# Patient Record
Sex: FEMALE | Race: WHITE | Employment: OTHER | URBAN - METROPOLITAN AREA
[De-identification: names, ages, dates, MRNs, and addresses within clinical notes are randomized per-mention and may not be internally consistent; named-entity substitution may affect disease eponyms.]

---

## 2017-04-05 ENCOUNTER — RECORDS - HEALTHEAST (OUTPATIENT)
Dept: LAB | Facility: CLINIC | Age: 67
End: 2017-04-05

## 2017-04-06 LAB
ALT SERPL W P-5'-P-CCNC: 17 U/L (ref 0–45)
CHOLEST SERPL-MCNC: 200 MG/DL
FASTING STATUS PATIENT QL REPORTED: ABNORMAL
HDLC SERPL-MCNC: 40 MG/DL
LDLC SERPL CALC-MCNC: 139 MG/DL
TRIGL SERPL-MCNC: 106 MG/DL

## 2018-01-09 ENCOUNTER — RECORDS - HEALTHEAST (OUTPATIENT)
Dept: LAB | Facility: CLINIC | Age: 68
End: 2018-01-09

## 2018-01-09 LAB
ALBUMIN UR-MCNC: NEGATIVE MG/DL
APPEARANCE UR: CLEAR
BACTERIA #/AREA URNS HPF: ABNORMAL HPF
BILIRUB UR QL STRIP: NEGATIVE
COLOR UR AUTO: ABNORMAL
GLUCOSE UR STRIP-MCNC: NEGATIVE MG/DL
HGB UR QL STRIP: ABNORMAL
KETONES UR STRIP-MCNC: NEGATIVE MG/DL
LEUKOCYTE ESTERASE UR QL STRIP: NEGATIVE
NITRATE UR QL: NEGATIVE
PH UR STRIP: 5 [PH] (ref 4.5–8)
RBC #/AREA URNS AUTO: ABNORMAL HPF
SP GR UR STRIP: 1 (ref 1–1.03)
SQUAMOUS #/AREA URNS AUTO: ABNORMAL LPF
TRANS CELLS #/AREA URNS HPF: ABNORMAL LPF
UROBILINOGEN UR STRIP-ACNC: ABNORMAL
WBC #/AREA URNS AUTO: ABNORMAL HPF

## 2018-07-05 ENCOUNTER — RECORDS - HEALTHEAST (OUTPATIENT)
Dept: LAB | Facility: CLINIC | Age: 68
End: 2018-07-05

## 2018-07-05 LAB
ANION GAP SERPL CALCULATED.3IONS-SCNC: 12 MMOL/L (ref 5–18)
BASOPHILS # BLD AUTO: 0 THOU/UL (ref 0–0.2)
BASOPHILS NFR BLD AUTO: 0 % (ref 0–2)
BUN SERPL-MCNC: 22 MG/DL (ref 8–28)
CALCIUM SERPL-MCNC: 9.3 MG/DL (ref 8.5–10.5)
CHLORIDE BLD-SCNC: 108 MMOL/L (ref 98–107)
CO2 SERPL-SCNC: 22 MMOL/L (ref 22–31)
CREAT SERPL-MCNC: 1.06 MG/DL (ref 0.6–1.1)
EOSINOPHIL # BLD AUTO: 0.1 THOU/UL (ref 0–0.4)
EOSINOPHIL NFR BLD AUTO: 2 % (ref 0–6)
ERYTHROCYTE [DISTWIDTH] IN BLOOD BY AUTOMATED COUNT: 13.6 % (ref 11–14.5)
GFR SERPL CREATININE-BSD FRML MDRD: 51 ML/MIN/1.73M2
GLUCOSE BLD-MCNC: 125 MG/DL (ref 70–125)
HCT VFR BLD AUTO: 35.1 % (ref 35–47)
HGB BLD-MCNC: 11.3 G/DL (ref 12–16)
LYMPHOCYTES # BLD AUTO: 1.4 THOU/UL (ref 0.8–4.4)
LYMPHOCYTES NFR BLD AUTO: 24 % (ref 20–40)
MCH RBC QN AUTO: 31 PG (ref 27–34)
MCHC RBC AUTO-ENTMCNC: 32.2 G/DL (ref 32–36)
MCV RBC AUTO: 96 FL (ref 80–100)
MONOCYTES # BLD AUTO: 0.2 THOU/UL (ref 0–0.9)
MONOCYTES NFR BLD AUTO: 4 % (ref 2–10)
NEUTROPHILS # BLD AUTO: 3.9 THOU/UL (ref 2–7.7)
NEUTROPHILS NFR BLD AUTO: 70 % (ref 50–70)
PLATELET # BLD AUTO: 330 THOU/UL (ref 140–440)
PMV BLD AUTO: 9.3 FL (ref 8.5–12.5)
POTASSIUM BLD-SCNC: 3.9 MMOL/L (ref 3.5–5)
RBC # BLD AUTO: 3.65 MILL/UL (ref 3.8–5.4)
SODIUM SERPL-SCNC: 142 MMOL/L (ref 136–145)
WBC: 5.6 THOU/UL (ref 4–11)

## 2018-07-23 ENCOUNTER — RECORDS - HEALTHEAST (OUTPATIENT)
Dept: LAB | Facility: CLINIC | Age: 68
End: 2018-07-23

## 2018-07-24 LAB
ANION GAP SERPL CALCULATED.3IONS-SCNC: 6 MMOL/L (ref 5–18)
BUN SERPL-MCNC: 32 MG/DL (ref 8–28)
CALCIUM SERPL-MCNC: 8.9 MG/DL (ref 8.5–10.5)
CHLORIDE BLD-SCNC: 110 MMOL/L (ref 98–107)
CO2 SERPL-SCNC: 25 MMOL/L (ref 22–31)
CREAT SERPL-MCNC: 0.88 MG/DL (ref 0.6–1.1)
ERYTHROCYTE [DISTWIDTH] IN BLOOD BY AUTOMATED COUNT: 13.3 % (ref 11–14.5)
GFR SERPL CREATININE-BSD FRML MDRD: >60 ML/MIN/1.73M2
GLUCOSE BLD-MCNC: 86 MG/DL (ref 70–125)
HCT VFR BLD AUTO: 31.9 % (ref 35–47)
HGB BLD-MCNC: 10 G/DL (ref 12–16)
MCH RBC QN AUTO: 30.7 PG (ref 27–34)
MCHC RBC AUTO-ENTMCNC: 31.3 G/DL (ref 32–36)
MCV RBC AUTO: 98 FL (ref 80–100)
PLATELET # BLD AUTO: 284 THOU/UL (ref 140–440)
PMV BLD AUTO: 9.4 FL (ref 8.5–12.5)
POTASSIUM BLD-SCNC: 4.1 MMOL/L (ref 3.5–5)
RBC # BLD AUTO: 3.26 MILL/UL (ref 3.8–5.4)
SODIUM SERPL-SCNC: 141 MMOL/L (ref 136–145)
WBC: 5.4 THOU/UL (ref 4–11)

## 2018-07-25 ENCOUNTER — RECORDS - HEALTHEAST (OUTPATIENT)
Dept: LAB | Facility: CLINIC | Age: 68
End: 2018-07-25

## 2018-07-26 LAB
ERYTHROCYTE [DISTWIDTH] IN BLOOD BY AUTOMATED COUNT: 13.3 % (ref 11–14.5)
HCT VFR BLD AUTO: 32.8 % (ref 35–47)
HGB BLD-MCNC: 10.1 G/DL (ref 12–16)
MCH RBC QN AUTO: 30.2 PG (ref 27–34)
MCHC RBC AUTO-ENTMCNC: 30.8 G/DL (ref 32–36)
MCV RBC AUTO: 98 FL (ref 80–100)
PLATELET # BLD AUTO: 285 THOU/UL (ref 140–440)
PMV BLD AUTO: 9.6 FL (ref 8.5–12.5)
RBC # BLD AUTO: 3.34 MILL/UL (ref 3.8–5.4)
WBC: 5.2 THOU/UL (ref 4–11)

## 2018-10-09 ENCOUNTER — RECORDS - HEALTHEAST (OUTPATIENT)
Dept: LAB | Facility: CLINIC | Age: 68
End: 2018-10-09

## 2018-10-09 LAB
ANION GAP SERPL CALCULATED.3IONS-SCNC: 10 MMOL/L (ref 5–18)
BUN SERPL-MCNC: 34 MG/DL (ref 8–28)
CALCIUM SERPL-MCNC: 8.9 MG/DL (ref 8.5–10.5)
CHLORIDE BLD-SCNC: 107 MMOL/L (ref 98–107)
CO2 SERPL-SCNC: 19 MMOL/L (ref 22–31)
CREAT SERPL-MCNC: 0.97 MG/DL (ref 0.6–1.1)
GFR SERPL CREATININE-BSD FRML MDRD: 57 ML/MIN/1.73M2
GLUCOSE BLD-MCNC: 93 MG/DL (ref 70–125)
POTASSIUM BLD-SCNC: 4.7 MMOL/L (ref 3.5–5)
SODIUM SERPL-SCNC: 136 MMOL/L (ref 136–145)

## 2018-10-10 LAB
FLUAV AG SPEC QL IA: NORMAL
FLUBV AG SPEC QL IA: NORMAL

## 2019-01-02 ENCOUNTER — RECORDS - HEALTHEAST (OUTPATIENT)
Dept: LAB | Facility: CLINIC | Age: 69
End: 2019-01-02

## 2019-01-03 LAB
25(OH)D3 SERPL-MCNC: 35.3 NG/ML (ref 30–80)
BASOPHILS # BLD AUTO: 0 THOU/UL (ref 0–0.2)
BASOPHILS NFR BLD AUTO: 1 % (ref 0–2)
EOSINOPHIL # BLD AUTO: 0.1 THOU/UL (ref 0–0.4)
EOSINOPHIL NFR BLD AUTO: 3 % (ref 0–6)
ERYTHROCYTE [DISTWIDTH] IN BLOOD BY AUTOMATED COUNT: 12.7 % (ref 11–14.5)
HCT VFR BLD AUTO: 31.8 % (ref 35–47)
HGB BLD-MCNC: 10.1 G/DL (ref 12–16)
LYMPHOCYTES # BLD AUTO: 1.3 THOU/UL (ref 0.8–4.4)
LYMPHOCYTES NFR BLD AUTO: 27 % (ref 20–40)
MCH RBC QN AUTO: 30.5 PG (ref 27–34)
MCHC RBC AUTO-ENTMCNC: 31.8 G/DL (ref 32–36)
MCV RBC AUTO: 96 FL (ref 80–100)
MONOCYTES # BLD AUTO: 0.4 THOU/UL (ref 0–0.9)
MONOCYTES NFR BLD AUTO: 8 % (ref 2–10)
NEUTROPHILS # BLD AUTO: 2.8 THOU/UL (ref 2–7.7)
NEUTROPHILS NFR BLD AUTO: 61 % (ref 50–70)
PLATELET # BLD AUTO: 272 THOU/UL (ref 140–440)
PMV BLD AUTO: 9.4 FL (ref 8.5–12.5)
RBC # BLD AUTO: 3.31 MILL/UL (ref 3.8–5.4)
WBC: 4.6 THOU/UL (ref 4–11)

## 2019-01-08 ENCOUNTER — RECORDS - HEALTHEAST (OUTPATIENT)
Dept: LAB | Facility: CLINIC | Age: 69
End: 2019-01-08

## 2019-01-08 LAB
BASOPHILS # BLD AUTO: 0 THOU/UL (ref 0–0.2)
BASOPHILS NFR BLD AUTO: 0 % (ref 0–2)
EOSINOPHIL # BLD AUTO: 0.1 THOU/UL (ref 0–0.4)
EOSINOPHIL NFR BLD AUTO: 1 % (ref 0–6)
ERYTHROCYTE [DISTWIDTH] IN BLOOD BY AUTOMATED COUNT: 13 % (ref 11–14.5)
HCT VFR BLD AUTO: 33.7 % (ref 35–47)
HGB BLD-MCNC: 10.8 G/DL (ref 12–16)
LYMPHOCYTES # BLD AUTO: 0.7 THOU/UL (ref 0.8–4.4)
LYMPHOCYTES NFR BLD AUTO: 10 % (ref 20–40)
MCH RBC QN AUTO: 30.7 PG (ref 27–34)
MCHC RBC AUTO-ENTMCNC: 32 G/DL (ref 32–36)
MCV RBC AUTO: 96 FL (ref 80–100)
MONOCYTES # BLD AUTO: 0.7 THOU/UL (ref 0–0.9)
MONOCYTES NFR BLD AUTO: 10 % (ref 2–10)
NEUTROPHILS # BLD AUTO: 6.1 THOU/UL (ref 2–7.7)
NEUTROPHILS NFR BLD AUTO: 80 % (ref 50–70)
PLATELET # BLD AUTO: 231 THOU/UL (ref 140–440)
PMV BLD AUTO: 9.8 FL (ref 8.5–12.5)
RBC # BLD AUTO: 3.52 MILL/UL (ref 3.8–5.4)
WBC: 7.7 THOU/UL (ref 4–11)

## 2019-01-09 ENCOUNTER — RECORDS - HEALTHEAST (OUTPATIENT)
Dept: LAB | Facility: CLINIC | Age: 69
End: 2019-01-09

## 2019-01-09 LAB
FLUAV AG SPEC QL IA: NORMAL
FLUBV AG SPEC QL IA: NORMAL

## 2019-01-29 ENCOUNTER — RECORDS - HEALTHEAST (OUTPATIENT)
Dept: LAB | Facility: CLINIC | Age: 69
End: 2019-01-29

## 2019-01-29 LAB
ERYTHROCYTE [DISTWIDTH] IN BLOOD BY AUTOMATED COUNT: 13.4 % (ref 11–14.5)
HCT VFR BLD AUTO: 34.1 % (ref 35–47)
HGB BLD-MCNC: 10.6 G/DL (ref 12–16)
MCH RBC QN AUTO: 30.1 PG (ref 27–34)
MCHC RBC AUTO-ENTMCNC: 31.1 G/DL (ref 32–36)
MCV RBC AUTO: 97 FL (ref 80–100)
PLATELET # BLD AUTO: 322 THOU/UL (ref 140–440)
PMV BLD AUTO: 9.8 FL (ref 8.5–12.5)
RBC # BLD AUTO: 3.52 MILL/UL (ref 3.8–5.4)
WBC: 7.9 THOU/UL (ref 4–11)

## 2019-02-22 ENCOUNTER — RECORDS - HEALTHEAST (OUTPATIENT)
Dept: LAB | Facility: CLINIC | Age: 69
End: 2019-02-22

## 2019-02-22 LAB
ERYTHROCYTE [DISTWIDTH] IN BLOOD BY AUTOMATED COUNT: 13.4 % (ref 11–14.5)
FLUAV AG SPEC QL IA: NORMAL
FLUBV AG SPEC QL IA: NORMAL
HCT VFR BLD AUTO: 33.3 % (ref 35–47)
HGB BLD-MCNC: 10.6 G/DL (ref 12–16)
MCH RBC QN AUTO: 30 PG (ref 27–34)
MCHC RBC AUTO-ENTMCNC: 31.8 G/DL (ref 32–36)
MCV RBC AUTO: 94 FL (ref 80–100)
PLATELET # BLD AUTO: 279 THOU/UL (ref 140–440)
PMV BLD AUTO: 9.4 FL (ref 8.5–12.5)
RBC # BLD AUTO: 3.53 MILL/UL (ref 3.8–5.4)
WBC: 6.7 THOU/UL (ref 4–11)

## 2019-03-13 ENCOUNTER — RECORDS - HEALTHEAST (OUTPATIENT)
Dept: LAB | Facility: CLINIC | Age: 69
End: 2019-03-13

## 2019-03-13 LAB
FLUAV AG SPEC QL IA: NORMAL
FLUBV AG SPEC QL IA: NORMAL

## 2019-06-04 ENCOUNTER — RECORDS - HEALTHEAST (OUTPATIENT)
Dept: LAB | Facility: CLINIC | Age: 69
End: 2019-06-04

## 2019-06-04 LAB
ANION GAP SERPL CALCULATED.3IONS-SCNC: 8 MMOL/L (ref 5–18)
BUN SERPL-MCNC: 35 MG/DL (ref 8–28)
CALCIUM SERPL-MCNC: 9.6 MG/DL (ref 8.5–10.5)
CHLORIDE BLD-SCNC: 107 MMOL/L (ref 98–107)
CO2 SERPL-SCNC: 22 MMOL/L (ref 22–31)
CREAT SERPL-MCNC: 0.99 MG/DL (ref 0.6–1.1)
ERYTHROCYTE [DISTWIDTH] IN BLOOD BY AUTOMATED COUNT: 13.8 % (ref 11–14.5)
GFR SERPL CREATININE-BSD FRML MDRD: 55 ML/MIN/1.73M2
GLUCOSE BLD-MCNC: 95 MG/DL (ref 70–125)
HCT VFR BLD AUTO: 36.6 % (ref 35–47)
HGB BLD-MCNC: 11.8 G/DL (ref 12–16)
MCH RBC QN AUTO: 29.6 PG (ref 27–34)
MCHC RBC AUTO-ENTMCNC: 32.2 G/DL (ref 32–36)
MCV RBC AUTO: 92 FL (ref 80–100)
PLATELET # BLD AUTO: 282 THOU/UL (ref 140–440)
PMV BLD AUTO: 9.1 FL (ref 8.5–12.5)
POTASSIUM BLD-SCNC: 4.9 MMOL/L (ref 3.5–5)
RBC # BLD AUTO: 3.98 MILL/UL (ref 3.8–5.4)
SODIUM SERPL-SCNC: 137 MMOL/L (ref 136–145)
WBC: 16.5 THOU/UL (ref 4–11)

## 2019-06-05 ENCOUNTER — RECORDS - HEALTHEAST (OUTPATIENT)
Dept: LAB | Facility: CLINIC | Age: 69
End: 2019-06-05

## 2019-06-06 LAB — WBC: 6.3 THOU/UL (ref 4–11)

## 2019-12-18 ENCOUNTER — RECORDS - HEALTHEAST (OUTPATIENT)
Dept: LAB | Facility: CLINIC | Age: 69
End: 2019-12-18

## 2019-12-19 LAB
ANION GAP SERPL CALCULATED.3IONS-SCNC: 7 MMOL/L (ref 5–18)
BUN SERPL-MCNC: 34 MG/DL (ref 8–28)
CALCIUM SERPL-MCNC: 9.6 MG/DL (ref 8.5–10.5)
CHLORIDE BLD-SCNC: 107 MMOL/L (ref 98–107)
CO2 SERPL-SCNC: 27 MMOL/L (ref 22–31)
CREAT SERPL-MCNC: 1.2 MG/DL (ref 0.6–1.1)
GFR SERPL CREATININE-BSD FRML MDRD: 44 ML/MIN/1.73M2
GLUCOSE BLD-MCNC: 114 MG/DL (ref 70–125)
HGB BLD-MCNC: 12.2 G/DL (ref 12–16)
POTASSIUM BLD-SCNC: 4.1 MMOL/L (ref 3.5–5)
SODIUM SERPL-SCNC: 141 MMOL/L (ref 136–145)

## 2020-03-02 ENCOUNTER — RECORDS - HEALTHEAST (OUTPATIENT)
Dept: LAB | Facility: CLINIC | Age: 70
End: 2020-03-02

## 2020-03-03 LAB
ANION GAP SERPL CALCULATED.3IONS-SCNC: 7 MMOL/L (ref 5–18)
BUN SERPL-MCNC: 35 MG/DL (ref 8–28)
CALCIUM SERPL-MCNC: 9.4 MG/DL (ref 8.5–10.5)
CHLORIDE BLD-SCNC: 108 MMOL/L (ref 98–107)
CO2 SERPL-SCNC: 26 MMOL/L (ref 22–31)
CREAT SERPL-MCNC: 1.21 MG/DL (ref 0.6–1.1)
GFR SERPL CREATININE-BSD FRML MDRD: 44 ML/MIN/1.73M2
GLUCOSE BLD-MCNC: 85 MG/DL (ref 70–125)
POTASSIUM BLD-SCNC: 3.9 MMOL/L (ref 3.5–5)
SODIUM SERPL-SCNC: 141 MMOL/L (ref 136–145)

## 2020-07-22 ENCOUNTER — RECORDS - HEALTHEAST (OUTPATIENT)
Dept: LAB | Facility: CLINIC | Age: 70
End: 2020-07-22

## 2020-07-23 LAB
ANION GAP SERPL CALCULATED.3IONS-SCNC: 8 MMOL/L (ref 5–18)
BUN SERPL-MCNC: 35 MG/DL (ref 8–28)
CALCIUM SERPL-MCNC: 9.4 MG/DL (ref 8.5–10.5)
CHLORIDE BLD-SCNC: 105 MMOL/L (ref 98–107)
CO2 SERPL-SCNC: 25 MMOL/L (ref 22–31)
CREAT SERPL-MCNC: 1.03 MG/DL (ref 0.6–1.1)
GFR SERPL CREATININE-BSD FRML MDRD: 53 ML/MIN/1.73M2
GLUCOSE BLD-MCNC: 112 MG/DL (ref 70–125)
POTASSIUM BLD-SCNC: 3.8 MMOL/L (ref 3.5–5)
SODIUM SERPL-SCNC: 138 MMOL/L (ref 136–145)

## 2020-10-05 ENCOUNTER — RECORDS - HEALTHEAST (OUTPATIENT)
Dept: LAB | Facility: CLINIC | Age: 70
End: 2020-10-05

## 2020-10-06 LAB
ALBUMIN SERPL-MCNC: 3.3 G/DL (ref 3.5–5)
ALP SERPL-CCNC: 65 U/L (ref 45–120)
ALT SERPL W P-5'-P-CCNC: 14 U/L (ref 0–45)
AST SERPL W P-5'-P-CCNC: 14 U/L (ref 0–40)
BILIRUB DIRECT SERPL-MCNC: 0.1 MG/DL
BILIRUB SERPL-MCNC: 0.4 MG/DL (ref 0–1)
CHOLEST SERPL-MCNC: 198 MG/DL
FASTING STATUS PATIENT QL REPORTED: YES
HDLC SERPL-MCNC: 39 MG/DL
HGB BLD-MCNC: 12.4 G/DL (ref 12–16)
LDLC SERPL CALC-MCNC: 130 MG/DL
PROT SERPL-MCNC: 6.6 G/DL (ref 6–8)
TRIGL SERPL-MCNC: 143 MG/DL

## 2020-10-07 LAB — 25(OH)D3 SERPL-MCNC: 36.2 NG/ML (ref 30–80)

## 2020-10-21 VITALS
OXYGEN SATURATION: 93 % | HEART RATE: 75 BPM | RESPIRATION RATE: 22 BRPM | BODY MASS INDEX: 31.82 KG/M2 | HEIGHT: 66 IN | WEIGHT: 198 LBS | TEMPERATURE: 98 F | SYSTOLIC BLOOD PRESSURE: 144 MMHG | DIASTOLIC BLOOD PRESSURE: 64 MMHG

## 2020-10-21 PROBLEM — J12.82 PNEUMONIA DUE TO COVID-19 VIRUS: Status: ACTIVE | Noted: 2020-10-18

## 2020-10-21 PROBLEM — U07.1 PNEUMONIA DUE TO COVID-19 VIRUS: Status: ACTIVE | Noted: 2020-10-18

## 2020-10-21 PROBLEM — J96.01 ACUTE RESPIRATORY FAILURE WITH HYPOXIA (H): Status: ACTIVE | Noted: 2020-10-16

## 2020-10-21 PROBLEM — G82.20 PARAPLEGIA (H): Status: ACTIVE | Noted: 2020-10-21

## 2020-10-21 PROBLEM — N17.9 ACUTE KIDNEY INJURY (H): Status: ACTIVE | Noted: 2019-10-10

## 2020-10-21 RX ORDER — FAMOTIDINE 20 MG/1
20 TABLET, FILM COATED ORAL 2 TIMES DAILY
COMMUNITY
Start: 2020-10-20 | End: 2020-10-23

## 2020-10-21 RX ORDER — ECHINACEA PURPUREA EXTRACT 125 MG
1 TABLET ORAL 2 TIMES DAILY
COMMUNITY

## 2020-10-21 RX ORDER — ALENDRONATE SODIUM 70 MG/1
70 TABLET ORAL WEEKLY
COMMUNITY

## 2020-10-21 RX ORDER — OXYCODONE HYDROCHLORIDE 5 MG/1
2.5 TABLET ORAL EVERY 4 HOURS PRN
COMMUNITY
Start: 2020-10-20

## 2020-10-21 RX ORDER — BENZONATATE 100 MG/1
100 CAPSULE ORAL EVERY 4 HOURS PRN
COMMUNITY
Start: 2020-10-20

## 2020-10-21 RX ORDER — NYSTATIN 100000 [USP'U]/G
1 POWDER TOPICAL 2 TIMES DAILY
COMMUNITY

## 2020-10-21 RX ORDER — BACLOFEN 20 MG/1
20 TABLET ORAL 4 TIMES DAILY
COMMUNITY

## 2020-10-21 RX ORDER — DEXAMETHASONE 6 MG/1
6 TABLET ORAL DAILY
COMMUNITY
Start: 2020-10-21 | End: 2020-10-27

## 2020-10-21 RX ORDER — ACETAMINOPHEN 500 MG
TABLET ORAL
COMMUNITY

## 2020-10-21 RX ORDER — DOCUSATE SODIUM 100 MG/1
100 CAPSULE, LIQUID FILLED ORAL DAILY
COMMUNITY
Start: 2019-07-07

## 2020-10-21 RX ORDER — GUAIFENESIN 1200 MG/1
1200 TABLET, EXTENDED RELEASE ORAL 2 TIMES DAILY
COMMUNITY
Start: 2020-10-20 | End: 2020-10-25

## 2020-10-21 RX ORDER — MIRABEGRON 50 MG/1
50 TABLET, EXTENDED RELEASE ORAL DAILY
COMMUNITY
Start: 2019-07-23

## 2020-10-21 RX ORDER — PRAVASTATIN SODIUM 20 MG
20 TABLET ORAL AT BEDTIME
COMMUNITY

## 2020-10-21 RX ORDER — AMMONIUM LACTATE 12 G/100G
CREAM TOPICAL EVERY OTHER DAY
COMMUNITY

## 2020-10-21 RX ORDER — NORTRIPTYLINE HCL 10 MG
10 CAPSULE ORAL AT BEDTIME
COMMUNITY

## 2020-10-21 ASSESSMENT — MIFFLIN-ST. JEOR: SCORE: 1424.87

## 2020-10-21 NOTE — PROGRESS NOTES
" [facility]  Yulia Loza is being evaluated via a billable video.   The patient has been notified of following:  \"This video visit will be conducted via a call between you and your provider. We have found that certain health care needs can be provided without the need for an in-person physical exam.  This service lets us provide the care you need with a video conversation. If during the course of the call the provider feels a video visit is not appropriate, you will not be charged for this service.\"   The provider has received verbal consent for a Video Visit from the patient and or first contact? Yes  Patient/facility staff would like the video invitation sent by: N/A   Video Start Time: 10:43am  Which Facility the Patient is at during the time of visit: [facility]    PRIMARY CARE PROVIDER AND CLINIC:  No primary care provider on file., No primary physician on file.  Chief Complaint   Patient presents with     [facility] Medical Record Number:  [id number]  Yulia Loza  is a 70 year old  (1950), admitted to the above facility from  [facility]. Hospital stay 10/16/20 through 10/21/20..  Admitted to this facility for  rehab, medical management and nursing care.    HPI:    HPI information obtained from: facility chart records, facility staff, patient report and Care Everywhere Epic chart review.   Brief Summary of Hospital Course: Patient with PMH MS, HTN, neurogenic bladder, and obesity presented to the ED with acute respiratory failure. Tested positive for COVID-19. She was treated with supplemental oxygen, dexamethasone, and remdesivir. She was discharged off oxygen. She is at [facility] to complete her quarantine and then she will return to her SNF.    Updates on Status Since Skilled nursing Admission:   Patient denies SOB, cough, N/V/D. No fever or hypoxia. She has a pummel vest that she uses chronically, this was delivered from her nursing home. She was " recommended to have Trilogy NIV, but her SNF does not have training in this.  -170s/70-90s  HR 70s    CODE STATUS/ADVANCE DIRECTIVES DISCUSSION:   CPR/Full code   Patient's living condition: lives in a skilled nursing facility  ALLERGIES: Atorvastatin, Bactrim [sulfamethoxazole w/trimethoprim], Codeine sulfate [codeine], Honey, Levaquin [levofloxacin], Niacin, Niacinamide, Oxycodone, Peanuts [nuts], and Tegaderm ag mesh [silver]  PAST MEDICAL HISTORY:  has no past medical history on file.  PAST SURGICAL HISTORY:   has no past surgical history on file.  FAMILY HISTORY: family history is not on file.  SOCIAL HISTORY:     Current Outpatient Medications   Medication Sig Dispense Refill     acetaminophen (TYLENOL) 500 MG tablet Give 1000 mg @ 0800 and 500 mg @ 1200       alendronate (FOSAMAX) 70 MG tablet Take 70 mg by mouth once a week Every Saturday morning       ammonium lactate (AMLACTIN) 12 % external cream Apply topically every other day Apply to legs and feet       baclofen (LIORESAL) 20 MG tablet Take 20 mg by mouth 4 times daily       benzocaine (ANBESOL) 10 % gel Apply topically every 3 hours as needed       benzonatate (TESSALON) 100 MG capsule Take 100 mg by mouth every 4 hours as needed       dexamethasone (DECADRON) 6 MG tablet Take 6 mg by mouth daily       diclofenac (VOLTAREN) 1 % topical gel Apply 4 g topically 4 times daily       docusate sodium (COLACE) 100 MG capsule Take 100 mg by mouth daily       enoxaparin ANTICOAGULANT (LOVENOX) 40 MG/0.4ML syringe Inject 40 mg Subcutaneous daily       famotidine (PEPCID) 20 MG tablet Take 20 mg by mouth 2 times daily       guaiFENesin 1200 MG TB12 Take 1,200 mg by mouth 2 times daily       mirabegron (MYRBETRIQ) 50 MG 24 hr tablet Take 50 mg by mouth daily       nortriptyline (PAMELOR) 10 MG capsule Take 10 mg by mouth At Bedtime       nystatin (MYCOSTATIN) 817615 UNIT/GM external powder Apply 1 strip topically 2 times daily       oxyCODONE (ROXICODONE) 5  "MG tablet Take 2.5 mg by mouth every 4 hours as needed       pravastatin (PRAVACHOL) 20 MG tablet Take 20 mg by mouth At Bedtime       sodium chloride (OCEAN) 0.65 % nasal spray Spray 1 spray in nostril 2 times daily       Vitamin D3 (CHOLECALCIFEROL) 125 MCG (5000 UT) tablet Take by mouth three times a week Give on Monday, Wednesday, and Friday        discharge medications reconciled, continue medications without change    ROS: 10 point ROS of systems including Constitutional, Eyes, Respiratory, Cardiovascular, Gastroenterology, Genitourinary, Integumentary, Musculoskeletal, Psychiatric were all negative except for pertinent positives noted in my HPI.    Vitals:BP (!) 144/64   Pulse 75   Temp 98  F (36.7  C)   Resp 22   Ht 1.676 m (5' 6\")   Wt 89.8 kg (198 lb)   SpO2 93%   BMI 31.96 kg/m     Limited Visit Exam done given COVID-19 precautions:  GENERAL APPEARANCE:  Alert, in no distress, morbidly obese  EYES:  Conjunctiva and lids normal  RESP:  no respiratory distress, no cough during visit  PSYCH:  insight and judgement impaired, memory impaired , affect and mood normal    Lab/Diagnostic data:  Recent labs in [facility] reviewed by me today.     ASSESSMENT/PLAN:  (U07.1,  J12.89) Pneumonia due to COVID-19 virus  (primary encounter diagnosis)  Comment: Symptoms improving. Due to history of respiratory issues, will need to get up in chair regularly, certainly for the use of her pummel vest.  Plan: Continue current POC with no changes at this time. Monitor vitals, respiratory status    (G35) Multiple sclerosis (H)  (G82.20) Paraplegia (H)  Comment: Chronic functional and cognitive impairment requiring 24 hour care. Total dependence for ADLs. No acute concerns currently  Plan: Continue current POC with no changes at this time and adjustments as needed.    (I10) Essential hypertension  Comment: Poorly controlled. She is not on antihypertensive medications. History is not available. There is no mention of this in " hospital record. Will just monitor for now. She will be returning to her SNF soon, so will likely defer to PCP  Plan: Monitor BP    (R60.0) Bilateral edema of lower extremity  Comment: Chronic due to immobility  Plan: Continue current POC with no changes at this time and adjustments as needed.      Electronically signed by:  BERTIN Barahona CNP   [facility]  Phone: [phone]      Video-Visit Details  Type of service:  Video Visit  Video End Time (time video stopped): 10:47am  Distant Location (provider location):  [place] [facility]

## 2020-10-22 ENCOUNTER — VIRTUAL VISIT (OUTPATIENT)
Dept: GERIATRICS | Facility: CLINIC | Age: 70
End: 2020-10-22
Payer: COMMERCIAL

## 2020-10-22 DIAGNOSIS — J12.82 PNEUMONIA DUE TO COVID-19 VIRUS: Primary | ICD-10-CM

## 2020-10-22 DIAGNOSIS — R60.0 BILATERAL EDEMA OF LOWER EXTREMITY: ICD-10-CM

## 2020-10-22 DIAGNOSIS — G35 MULTIPLE SCLEROSIS (H): ICD-10-CM

## 2020-10-22 DIAGNOSIS — G82.20 PARAPLEGIA (H): ICD-10-CM

## 2020-10-22 DIAGNOSIS — U07.1 PNEUMONIA DUE TO COVID-19 VIRUS: Primary | ICD-10-CM

## 2020-10-22 DIAGNOSIS — I10 ESSENTIAL HYPERTENSION: ICD-10-CM

## 2020-10-22 PROCEDURE — 99309 SBSQ NF CARE MODERATE MDM 30: CPT | Mod: 95 | Performed by: NURSE PRACTITIONER

## 2020-10-22 NOTE — LETTER
"    10/22/2020        RE: Yulia Loza  [address] [place] [id number]         [place] [facility]  Yulia Loza is being evaluated via a billable video.   The patient has been notified of following:  \"This video visit will be conducted via a call between you and your provider. We have found that certain health care needs can be provided without the need for an in-person physical exam.  This service lets us provide the care you need with a video conversation. If during the course of the call the provider feels a video visit is not appropriate, you will not be charged for this service.\"   The provider has received verbal consent for a Video Visit from the patient and or first contact? Yes  Patient/facility staff would like the video invitation sent by: N/A   Video Start Time: 10:43am  Which Facility the Patient is at during the time of visit: [facility]    PRIMARY CARE PROVIDER AND CLINIC:  No primary care provider on file., No primary physician on file.  Chief Complaint   Patient presents with     [facility] Medical Record Number:  [id number]  Yulia Loza  is a 70 year old  (1950), admitted to the above facility from  [facility]. Hospital stay 10/16/20 through 10/21/20..  Admitted to this facility for  rehab, medical management and nursing care.    HPI:    HPI information obtained from: facility chart records, facility staff, patient report and Care Everywhere Epic chart review.   Brief Summary of Hospital Course: Patient with PMH MS, HTN, neurogenic bladder, and obesity presented to the ED with acute respiratory failure. Tested positive for COVID-19. She was treated with supplemental oxygen, dexamethasone, and remdesivir. She was discharged off oxygen. She is at [facility] to complete her quarantine and then she will return to her SNF.    Updates on Status Since Skilled nursing Admission:   Patient denies SOB, cough, N/V/D. No fever or hypoxia. She has a pummel vest " that she uses chronically, this was delivered from her nursing home. She was recommended to have Trilogy NIV, but her SNF does not have training in this.  -170s/70-90s  HR 70s    CODE STATUS/ADVANCE DIRECTIVES DISCUSSION:   CPR/Full code   Patient's living condition: lives in a skilled nursing facility  ALLERGIES: Atorvastatin, Bactrim [sulfamethoxazole w/trimethoprim], Codeine sulfate [codeine], Honey, Levaquin [levofloxacin], Niacin, Niacinamide, Oxycodone, Peanuts [nuts], and Tegaderm ag mesh [silver]  PAST MEDICAL HISTORY:  has no past medical history on file.  PAST SURGICAL HISTORY:   has no past surgical history on file.  FAMILY HISTORY: family history is not on file.  SOCIAL HISTORY:     Current Outpatient Medications   Medication Sig Dispense Refill     acetaminophen (TYLENOL) 500 MG tablet Give 1000 mg @ 0800 and 500 mg @ 1200       alendronate (FOSAMAX) 70 MG tablet Take 70 mg by mouth once a week Every Saturday morning       ammonium lactate (AMLACTIN) 12 % external cream Apply topically every other day Apply to legs and feet       baclofen (LIORESAL) 20 MG tablet Take 20 mg by mouth 4 times daily       benzocaine (ANBESOL) 10 % gel Apply topically every 3 hours as needed       benzonatate (TESSALON) 100 MG capsule Take 100 mg by mouth every 4 hours as needed       dexamethasone (DECADRON) 6 MG tablet Take 6 mg by mouth daily       diclofenac (VOLTAREN) 1 % topical gel Apply 4 g topically 4 times daily       docusate sodium (COLACE) 100 MG capsule Take 100 mg by mouth daily       enoxaparin ANTICOAGULANT (LOVENOX) 40 MG/0.4ML syringe Inject 40 mg Subcutaneous daily       famotidine (PEPCID) 20 MG tablet Take 20 mg by mouth 2 times daily       guaiFENesin 1200 MG TB12 Take 1,200 mg by mouth 2 times daily       mirabegron (MYRBETRIQ) 50 MG 24 hr tablet Take 50 mg by mouth daily       nortriptyline (PAMELOR) 10 MG capsule Take 10 mg by mouth At Bedtime       nystatin (MYCOSTATIN) 194312 UNIT/GM  "external powder Apply 1 strip topically 2 times daily       oxyCODONE (ROXICODONE) 5 MG tablet Take 2.5 mg by mouth every 4 hours as needed       pravastatin (PRAVACHOL) 20 MG tablet Take 20 mg by mouth At Bedtime       sodium chloride (OCEAN) 0.65 % nasal spray Spray 1 spray in nostril 2 times daily       Vitamin D3 (CHOLECALCIFEROL) 125 MCG (5000 UT) tablet Take by mouth three times a week Give on Monday, Wednesday, and Friday        discharge medications reconciled, continue medications without change    ROS: 10 point ROS of systems including Constitutional, Eyes, Respiratory, Cardiovascular, Gastroenterology, Genitourinary, Integumentary, Musculoskeletal, Psychiatric were all negative except for pertinent positives noted in my HPI.    Vitals:BP (!) 144/64   Pulse 75   Temp 98  F (36.7  C)   Resp 22   Ht 1.676 m (5' 6\")   Wt 89.8 kg (198 lb)   SpO2 93%   BMI 31.96 kg/m     Limited Visit Exam done given COVID-19 precautions:  GENERAL APPEARANCE:  Alert, in no distress, morbidly obese  EYES:  Conjunctiva and lids normal  RESP:  no respiratory distress, no cough during visit  PSYCH:  insight and judgement impaired, memory impaired , affect and mood normal    Lab/Diagnostic data:  Recent labs in [facility] reviewed by me today.     ASSESSMENT/PLAN:  (U07.1,  J12.89) Pneumonia due to COVID-19 virus  (primary encounter diagnosis)  Comment: Symptoms improving. Due to history of respiratory issues, will need to get up in chair regularly, certainly for the use of her pummel vest.  Plan: Continue current POC with no changes at this time. Monitor vitals, respiratory status    (G35) Multiple sclerosis (H)  (G82.20) Paraplegia (H)  Comment: Chronic functional and cognitive impairment requiring 24 hour care. Total dependence for ADLs. No acute concerns currently  Plan: Continue current POC with no changes at this time and adjustments as needed.    (I10) Essential hypertension  Comment: Poorly controlled. She is not on " antihypertensive medications. History is not available. There is no mention of this in hospital record. Will just monitor for now. She will be returning to her SNF soon, so will likely defer to PCP  Plan: Monitor BP    (R60.0) Bilateral edema of lower extremity  Comment: Chronic due to immobility  Plan: Continue current POC with no changes at this time and adjustments as needed.      Electronically signed by:  BERTIN Barahona CNP   [facility]  Phone: [phone]      Video-Visit Details  Type of service:  Video Visit  Video End Time (time video stopped): 10:47am  Distant Location (provider location):  [facility]

## 2020-10-27 ENCOUNTER — VIRTUAL VISIT (OUTPATIENT)
Dept: GERIATRICS | Facility: CLINIC | Age: 70
End: 2020-10-27
Payer: COMMERCIAL

## 2020-10-27 VITALS
DIASTOLIC BLOOD PRESSURE: 70 MMHG | SYSTOLIC BLOOD PRESSURE: 170 MMHG | WEIGHT: 188 LBS | RESPIRATION RATE: 18 BRPM | BODY MASS INDEX: 30.22 KG/M2 | TEMPERATURE: 97.8 F | HEIGHT: 66 IN | HEART RATE: 74 BPM | OXYGEN SATURATION: 94 %

## 2020-10-27 DIAGNOSIS — I10 ESSENTIAL HYPERTENSION: ICD-10-CM

## 2020-10-27 DIAGNOSIS — J12.82 PNEUMONIA DUE TO COVID-19 VIRUS: Primary | ICD-10-CM

## 2020-10-27 DIAGNOSIS — U07.1 PNEUMONIA DUE TO COVID-19 VIRUS: Primary | ICD-10-CM

## 2020-10-27 DIAGNOSIS — G82.20 PARAPLEGIA (H): ICD-10-CM

## 2020-10-27 DIAGNOSIS — G35 MULTIPLE SCLEROSIS (H): ICD-10-CM

## 2020-10-27 PROBLEM — J96.01 ACUTE RESPIRATORY FAILURE WITH HYPOXIA (H): Status: RESOLVED | Noted: 2020-10-16 | Resolved: 2020-10-27

## 2020-10-27 PROCEDURE — 99309 SBSQ NF CARE MODERATE MDM 30: CPT | Mod: 95 | Performed by: NURSE PRACTITIONER

## 2020-10-27 RX ORDER — LISINOPRIL 5 MG/1
5 TABLET ORAL DAILY
Start: 2020-10-27

## 2020-10-27 ASSESSMENT — MIFFLIN-ST. JEOR: SCORE: 1379.51

## 2020-10-27 NOTE — PROGRESS NOTES
"[facility]   Yulia Loza is being evaluated via a billable video visit.   The patient has been notified of following:  \"This video visit will be conducted via a call between you and your provider. We have found that certain health care needs can be provided without the need for an in-person physical exam.  This service lets us provide the care you need with a video conversation. If during the course of the call the provider feels a video visit is not appropriate, you will not be charged for this service.\"   The provider has received verbal consent for a Video Visit from the patient or first contact? Yes  Patient  or facility staff would like the video invitation sent by: N/A   Video Start Time: 10:45am    [facility] Record Number:  [id number]  Place of Location at the time of visit: [facility]  Chief Complaint   Patient presents with     RECHECK     HPI:  Yulia Loza  is a 70 year old (1950), who is being seen today for a visit.  [facility] stay 10/16/20 through 10/21/20. Admitted to this facility for  rehab, medical management and nursing care. Patient with PMH MS, HTN, neurogenic bladder, and obesity presented to the ED with acute respiratory failure. Tested positive for COVID-19. She was treated with supplemental oxygen, dexamethasone, and remdesivir. She was discharged off oxygen. She is at [facility] to complete her quarantine and then she will return to her SNF.    HPI information obtained from: facility chart records, facility staff and patient report.     Today's concern is:  Pneumonia due to COVID-19 virus  No SOB, cough, hypoxia, fever. Patient's son was under the impression that Yulia would be going back to [facility] by now based on what the hospitalists had told him. Yulia is advised that based on her hospital course, we are considering her to have had severe illness and recommend 20 days of isolation in order to prevent further spread. Yulia is agreeable " to this and willing to stay another week.    Multiple sclerosis (H)  Paraplegia (H)  Patient is dependent on staff for ADLs, [facility] for transfers, wheelchair bound. She uses a pummel vest twice daily for secretions. She has no acute concerns today     Essential hypertension  -180s/70-80s. Yulia says she has been on lisinopril in the past, but this was stopped due to hypotension. She did not have any side effects to it that she is aware of      Past Medical and Surgical History reviewed in Epic today.  MEDICATIONS:    Current Outpatient Medications   Medication Sig Dispense Refill     acetaminophen (TYLENOL) 500 MG tablet Give 1000 mg @ 0800 and 500 mg @ 1200       alendronate (FOSAMAX) 70 MG tablet Take 70 mg by mouth once a week Every Saturday morning       ammonium lactate (AMLACTIN) 12 % external cream Apply topically every other day Apply to legs and feet       baclofen (LIORESAL) 20 MG tablet Take 20 mg by mouth 4 times daily       benzocaine (ANBESOL) 10 % gel Apply topically every 3 hours as needed       benzonatate (TESSALON) 100 MG capsule Take 100 mg by mouth every 4 hours as needed       diclofenac (VOLTAREN) 1 % topical gel Apply 4 g topically 4 times daily       docusate sodium (COLACE) 100 MG capsule Take 100 mg by mouth daily       enoxaparin ANTICOAGULANT (LOVENOX) 40 MG/0.4ML syringe Inject 40 mg Subcutaneous daily       mirabegron (MYRBETRIQ) 50 MG 24 hr tablet Take 50 mg by mouth daily       nortriptyline (PAMELOR) 10 MG capsule Take 10 mg by mouth At Bedtime       nystatin (MYCOSTATIN) 972857 UNIT/GM external powder Apply 1 strip topically 2 times daily       oxyCODONE (ROXICODONE) 5 MG tablet Take 2.5 mg by mouth every 4 hours as needed       pravastatin (PRAVACHOL) 20 MG tablet Take 20 mg by mouth At Bedtime       sodium chloride (OCEAN) 0.65 % nasal spray Spray 1 spray in nostril 2 times daily       Vitamin D3 (CHOLECALCIFEROL) 125 MCG (5000 UT) tablet Take by mouth three times a week  "Give on Monday, Wednesday, and Friday       REVIEW OF SYSTEMS: 10 point ROS of systems including Constitutional, Eyes, Respiratory, Cardiovascular, Gastroenterology, Genitourinary, Integumentary, Musculoskeletal, Psychiatric were all negative except for pertinent positives noted in my HPI.    Objective: BP (!) 170/70   Pulse 74   Temp 97.8  F (36.6  C)   Resp 18   Ht 1.676 m (5' 6\")   Wt 85.3 kg (188 lb)   SpO2 94%   BMI 30.34 kg/m    Limited visit exam done given COVID-19 precautions.   GENERAL APPEARANCE:  Alert, in no distress, morbidly obese  EYES:  Conjunctiva and lids normal  RESP:  no respiratory distress, no cough during visit  PSYCH: oriented x 3, affect and mood normal    Labs:   Recent labs in [facility] reviewed by me today.     ASSESSMENT/PLAN:  (U07.1,  J12.89) Pneumonia due to COVID-19 virus  (primary encounter diagnosis)  Comment: Acute infection resolved, but as mentioned in HPI, recommend 20 days of isolation from positive test due to severe illness. Given that she lives in a SNF with a vulnerable population, would not want to risk spreading the virus to the residents there  Plan: Monitor for symptoms. Transfer back to SNF next week.    (G35) Multiple sclerosis (H)  (G82.20) Paraplegia (H)  Comment: Severe functional impairment, requires 24 hour skilled nursing care. No acute concerns  Plan: Continue current POC with no changes at this time and adjustments as needed.    (I10) Essential hypertension  Comment: Poorly controlled. To avoid risk of hypotension, falls, dizziness and tissue hypoperfusion, recommend  BP goal is < 150/90mmHg.  Plan: Lisinopril 5mg daily. Monitor BP.      Electronically signed by:  BERTIN Barahona CNP   [facility]  Phone: [phone]      Video-Visit Details  Type of service:  Video Visit  Video End Time (time video stopped): 10:51am  Distant Location (provider location):  [facility]         "

## 2020-10-27 NOTE — LETTER
"    10/27/2020        RE: Yulia Loza  [address] [place] [id number]        [facility]   Yulia Loza is being evaluated via a billable video visit.   The patient has been notified of following:  \"This video visit will be conducted via a call between you and your provider. We have found that certain health care needs can be provided without the need for an in-person physical exam.  This service lets us provide the care you need with a video conversation. If during the course of the call the provider feels a video visit is not appropriate, you will not be charged for this service.\"   The provider has received verbal consent for a Video Visit from the patient or first contact? Yes  Patient  or facility staff would like the video invitation sent by: N/A   Video Start Time: 10:45am    [facility] Medical Record Number:  [id number]  Place of Location at the time of visit: [facility] [address]  Chief Complaint   Patient presents with     RECHECK     HPI:  Yulia Loza  is a 70 year old (1950), who is being seen today for a visit.  [facility] stay 10/16/20 through 10/21/20. Admitted to this facility for  rehab, medical management and nursing care. Patient with PMH MS, HTN, neurogenic bladder, and obesity presented to the ED with acute respiratory failure. Tested positive for COVID-19. She was treated with supplemental oxygen, dexamethasone, and remdesivir. She was discharged off oxygen. She is at [facility] to complete her quarantine and then she will return to her SNF.    HPI information obtained from: facility chart records, facility staff and patient report.     Today's concern is:  Pneumonia due to COVID-19 virus  No SOB, cough, hypoxia, fever. Patient's son was under the impression that Yulia would be going back to [facility] by now based on what the hospitalists had told him. Yulia is advised that based on her hospital course, we are considering her to have had severe illness and " recommend 20 days of isolation in order to prevent further spread. Yulia is agreeable to this and willing to stay another week.    Multiple sclerosis (H)  Paraplegia (H)  Patient is dependent on staff for ADLs, [facility] for transfers, wheelchair bound. She uses a pummel vest twice daily for secretions. She has no acute concerns today     Essential hypertension  -180s/70-80s. Yulia says she has been on lisinopril in the past, but this was stopped due to hypotension. She did not have any side effects to it that she is aware of      Past Medical and Surgical History reviewed in Epic today.  MEDICATIONS:    Current Outpatient Medications   Medication Sig Dispense Refill     acetaminophen (TYLENOL) 500 MG tablet Give 1000 mg @ 0800 and 500 mg @ 1200       alendronate (FOSAMAX) 70 MG tablet Take 70 mg by mouth once a week Every Saturday morning       ammonium lactate (AMLACTIN) 12 % external cream Apply topically every other day Apply to legs and feet       baclofen (LIORESAL) 20 MG tablet Take 20 mg by mouth 4 times daily       benzocaine (ANBESOL) 10 % gel Apply topically every 3 hours as needed       benzonatate (TESSALON) 100 MG capsule Take 100 mg by mouth every 4 hours as needed       diclofenac (VOLTAREN) 1 % topical gel Apply 4 g topically 4 times daily       docusate sodium (COLACE) 100 MG capsule Take 100 mg by mouth daily       enoxaparin ANTICOAGULANT (LOVENOX) 40 MG/0.4ML syringe Inject 40 mg Subcutaneous daily       mirabegron (MYRBETRIQ) 50 MG 24 hr tablet Take 50 mg by mouth daily       nortriptyline (PAMELOR) 10 MG capsule Take 10 mg by mouth At Bedtime       nystatin (MYCOSTATIN) 405938 UNIT/GM external powder Apply 1 strip topically 2 times daily       oxyCODONE (ROXICODONE) 5 MG tablet Take 2.5 mg by mouth every 4 hours as needed       pravastatin (PRAVACHOL) 20 MG tablet Take 20 mg by mouth At Bedtime       sodium chloride (OCEAN) 0.65 % nasal spray Spray 1 spray in nostril 2 times daily        "Vitamin D3 (CHOLECALCIFEROL) 125 MCG (5000 UT) tablet Take by mouth three times a week Give on Monday, Wednesday, and Friday       REVIEW OF SYSTEMS: 10 point ROS of systems including Constitutional, Eyes, Respiratory, Cardiovascular, Gastroenterology, Genitourinary, Integumentary, Musculoskeletal, Psychiatric were all negative except for pertinent positives noted in my HPI.    Objective: BP (!) 170/70   Pulse 74   Temp 97.8  F (36.6  C)   Resp 18   Ht 1.676 m (5' 6\")   Wt 85.3 kg (188 lb)   SpO2 94%   BMI 30.34 kg/m    Limited visit exam done given COVID-19 precautions.   GENERAL APPEARANCE:  Alert, in no distress, morbidly obese  EYES:  Conjunctiva and lids normal  RESP:  no respiratory distress, no cough during visit  PSYCH: oriented x 3, affect and mood normal    Labs:   Recent labs in EPIC reviewed by me today.     ASSESSMENT/PLAN:  (U07.1,  J12.89) Pneumonia due to COVID-19 virus  (primary encounter diagnosis)  Comment: Acute infection resolved, but as mentioned in HPI, recommend 20 days of isolation from positive test due to severe illness. Given that she lives in a SNF with a vulnerable population, would not want to risk spreading the virus to the residents there  Plan: Monitor for symptoms. Transfer back to SNF next week.    (G35) Multiple sclerosis (H)  (G82.20) Paraplegia (H)  Comment: Severe functional impairment, requires 24 hour skilled nursing care. No acute concerns  Plan: Continue current POC with no changes at this time and adjustments as needed.    (I10) Essential hypertension  Comment: Poorly controlled. To avoid risk of hypotension, falls, dizziness and tissue hypoperfusion, recommend  BP goal is < 150/90mmHg.  Plan: Lisinopril 5mg daily. Monitor BP.      Electronically signed by:  BERTIN Barahona [facility]  Phone: [phone]      Video-Visit Details  Type of service:  Video Visit  Video End Time (time video stopped): 10:51am  Distant Location (provider " location):  [facility]

## 2020-10-29 ENCOUNTER — VIRTUAL VISIT (OUTPATIENT)
Dept: GERIATRICS | Facility: CLINIC | Age: 70
End: 2020-10-29
Payer: COMMERCIAL

## 2020-10-29 VITALS
TEMPERATURE: 98.5 F | SYSTOLIC BLOOD PRESSURE: 122 MMHG | HEIGHT: 66 IN | BODY MASS INDEX: 30.22 KG/M2 | DIASTOLIC BLOOD PRESSURE: 72 MMHG | HEART RATE: 95 BPM | RESPIRATION RATE: 18 BRPM | WEIGHT: 188 LBS | OXYGEN SATURATION: 92 %

## 2020-10-29 DIAGNOSIS — U07.1 PNEUMONIA DUE TO COVID-19 VIRUS: Primary | ICD-10-CM

## 2020-10-29 DIAGNOSIS — J12.82 PNEUMONIA DUE TO COVID-19 VIRUS: Primary | ICD-10-CM

## 2020-10-29 DIAGNOSIS — I10 ESSENTIAL HYPERTENSION: ICD-10-CM

## 2020-10-29 DIAGNOSIS — G35 MULTIPLE SCLEROSIS (H): ICD-10-CM

## 2020-10-29 DIAGNOSIS — G82.20 PARAPLEGIA (H): ICD-10-CM

## 2020-10-29 PROCEDURE — 99315 NF DSCHRG MGMT 30 MIN/LESS: CPT | Mod: 95 | Performed by: NURSE PRACTITIONER

## 2020-10-29 ASSESSMENT — MIFFLIN-ST. JEOR: SCORE: 1379.51

## 2020-10-29 NOTE — PROGRESS NOTES
"[facility] DISCHARGE SUMMARY  Yulia Loza is being evaluated via a billable video visit.   The patient has been notified of following:  \"This video visit will be conducted via a call between you and a [facility] provider. We have found that certain health care needs can be provided without the need for an in-person physical exam.  This service lets us provide the care you need with a video conversation. If during the course of the call the provider feels a video visit is not appropriate, you will not be charged for this service.\"   The provider has received verbal consent for a Video Visit from the patient or family/first contact? Yes  Patient or /facility staff would like the video invitation sent by: N/A   Video Start Time: 10:42am    PATIENT'S NAME: Yulia Loza  YOB: 1950  MEDICAL RECORD NUMBER:  [id number]  Place of Location at the time of visit: [facility]  PRIMARY CARE PROVIDER AND CLINIC RESPONSIBLE AFTER TRANSFER:   Marisa Armstrong MD, [facility] [address] / [place] [address];  Non-[facility] Provider     Transferring providers: BERTIN Hernandez CNP; Cullen Carolina MD  Recent Hospitalization/ED:  [facility] stay 10/16/20 to 10/21/20.  Date of SNF Admission: October / 21 / 2020  Date of SNF (anticipated) Discharge: November / 03 / 2020  Discharged to: previous SNF    CODE STATUS/ADVANCE DIRECTIVES DISCUSSION:  Full Code   ALLERGIES: Atorvastatin, Bactrim [sulfamethoxazole w/trimethoprim], Codeine sulfate [codeine], Honey, Levaquin [levofloxacin], Niacin, Niacinamide, Oxycodone, Peanuts [nuts], and Tegaderm ag mesh [silver]    DISCHARGE DIAGNOSIS/NURSING FACILITY COURSE:   [facility] stay 10/16/20 through 10/21/20. Admitted to this facility for  rehab, medical management and nursing care. Patient with PMH MS, HTN, neurogenic bladder, and obesity presented to the ED with acute respiratory failure. Tested positive for COVID-19. She was treated with " supplemental oxygen, dexamethasone, and remdesivir. She was discharged off oxygen. She is at [facility] to complete her quarantine and then she will return to her SNF.    Pneumonia due to COVID-19 virus  No SOB, cough, hypoxia, fever. Based on her hospital course, she was considered to have had severe illness and recommended 20 days of isolation in order to prevent further spread. The 20 days will be completed next week and she will be safe to return to her facility. She is on a 30 day course of Lovenox for VTE prophylaxis    Multiple sclerosis (H)  Paraplegia (H)  Patient is dependent on staff for ADLs, Analia for transfers, wheelchair bound. She uses a pummel vest twice daily for secretions. She has no acute concerns today     Essential hypertension  -180s/70-80s. Yulia says she has been on lisinopril in the past, but this was stopped due to hypotension. She did not have any side effects to it that she is aware of. Lisinopril 5mg was started 10/27/20. To avoid risk of hypotension, falls, dizziness and tissue hypoperfusion, recommend  BP goal is < 150/90mmHg.      Past Medical History:  has no past medical history on file.  Discharge Medications:    Current Outpatient Medications   Medication Sig Dispense Refill     acetaminophen (TYLENOL) 500 MG tablet Give 1000 mg @ 0800 and 500 mg @ 1200       alendronate (FOSAMAX) 70 MG tablet Take 70 mg by mouth once a week Every Saturday morning       ammonium lactate (AMLACTIN) 12 % external cream Apply topically every other day Apply to legs and feet       baclofen (LIORESAL) 20 MG tablet Take 20 mg by mouth 4 times daily       benzocaine (ANBESOL) 10 % gel Apply topically every 3 hours as needed       benzonatate (TESSALON) 100 MG capsule Take 100 mg by mouth every 4 hours as needed       diclofenac (VOLTAREN) 1 % topical gel Apply 4 g topically 4 times daily       docusate sodium (COLACE) 100 MG capsule Take 100 mg by mouth daily       enoxaparin ANTICOAGULANT  "(LOVENOX) 40 MG/0.4ML syringe Inject 40 mg Subcutaneous daily       lisinopril (ZESTRIL) 5 MG tablet Take 1 tablet (5 mg) by mouth daily       mirabegron (MYRBETRIQ) 50 MG 24 hr tablet Take 50 mg by mouth daily       nortriptyline (PAMELOR) 10 MG capsule Take 10 mg by mouth At Bedtime       nystatin (MYCOSTATIN) 908207 UNIT/GM external powder Apply 1 strip topically 2 times daily       oxyCODONE (ROXICODONE) 5 MG tablet Take 2.5 mg by mouth every 4 hours as needed       pravastatin (PRAVACHOL) 20 MG tablet Take 20 mg by mouth At Bedtime       sodium chloride (OCEAN) 0.65 % nasal spray Spray 1 spray in nostril 2 times daily       Vitamin D3 (CHOLECALCIFEROL) 125 MCG (5000 UT) tablet Take by mouth three times a week Give on Monday, Wednesday, and Friday        Medication Changes/Rationale:     Lisinopril was started for HTN  Controlled medications sent with patient:   ok to send remaining oxycodone     ROS: 10 point ROS of systems including Constitutional, Eyes, Respiratory, Cardiovascular, Gastroenterology, Genitourinary, Integumentary, Musculoskeletal, Psychiatric were all negative except for pertinent positives noted in my HPI.    Physical Exam: Vitals: /72   Pulse 95   Temp 98.5  F (36.9  C)   Resp 18   Ht 1.676 m (5' 6\")   Wt 85.3 kg (188 lb)   SpO2 92%   BMI 30.34 kg/m   BMI= Body mass index is 30.34 kg/m .  Limited Visit exam done for COVID-19 precautions  GENERAL APPEARANCE:  Alert, in no distress, morbidly obese  EYES:  Conjunctiva and lids normal  RESP:  no respiratory distress, no cough during visit  PSYCH: oriented x 3, affect and mood normal      SNF labs: none    DISCHARGE PLAN:    Follow up labs: No labs orders/due    Medical Follow Up:      Follow up with primary care provider in 1-2 weeks      TOTAL DISCHARGE TIME:   Less than or equal to 30 minutes  Electronically signed by:  BERTIN Barahona [facility]  Phone: [phone]      Video-Visit Details  Type of " service:  Video Visit  Video End Time (time video stopped): 10:43am  Distant Location (provider location):  [facility]

## 2021-03-10 ENCOUNTER — RECORDS - HEALTHEAST (OUTPATIENT)
Dept: LAB | Facility: CLINIC | Age: 71
End: 2021-03-10

## 2021-03-11 LAB
ANION GAP SERPL CALCULATED.3IONS-SCNC: 10 MMOL/L (ref 5–18)
BUN SERPL-MCNC: 43 MG/DL (ref 8–28)
CALCIUM SERPL-MCNC: 9.5 MG/DL (ref 8.5–10.5)
CHLORIDE BLD-SCNC: 104 MMOL/L (ref 98–107)
CO2 SERPL-SCNC: 26 MMOL/L (ref 22–31)
CREAT SERPL-MCNC: 0.92 MG/DL (ref 0.6–1.1)
GFR SERPL CREATININE-BSD FRML MDRD: 60 ML/MIN/1.73M2
GLUCOSE BLD-MCNC: 77 MG/DL (ref 70–125)
POTASSIUM BLD-SCNC: 4.5 MMOL/L (ref 3.5–5)
SODIUM SERPL-SCNC: 140 MMOL/L (ref 136–145)

## 2021-05-09 ENCOUNTER — RECORDS - HEALTHEAST (OUTPATIENT)
Dept: LAB | Facility: CLINIC | Age: 71
End: 2021-05-09

## 2021-05-11 LAB
ANION GAP SERPL CALCULATED.3IONS-SCNC: 10 MMOL/L (ref 5–18)
BUN SERPL-MCNC: 43 MG/DL (ref 8–28)
CALCIUM SERPL-MCNC: 9.1 MG/DL (ref 8.5–10.5)
CHLORIDE BLD-SCNC: 107 MMOL/L (ref 98–107)
CO2 SERPL-SCNC: 24 MMOL/L (ref 22–31)
CREAT SERPL-MCNC: 0.82 MG/DL (ref 0.6–1.1)
ERYTHROCYTE [DISTWIDTH] IN BLOOD BY AUTOMATED COUNT: 13.1 % (ref 11–14.5)
GFR SERPL CREATININE-BSD FRML MDRD: >60 ML/MIN/1.73M2
GLUCOSE BLD-MCNC: 88 MG/DL (ref 70–125)
HCT VFR BLD AUTO: 41.2 % (ref 35–47)
HGB BLD-MCNC: 13.1 G/DL (ref 12–16)
MCH RBC QN AUTO: 31 PG (ref 27–34)
MCHC RBC AUTO-ENTMCNC: 31.8 G/DL (ref 32–36)
MCV RBC AUTO: 97 FL (ref 80–100)
PLATELET # BLD AUTO: 289 THOU/UL (ref 140–440)
PMV BLD AUTO: 9.4 FL (ref 8.5–12.5)
POTASSIUM BLD-SCNC: 4.3 MMOL/L (ref 3.5–5)
RBC # BLD AUTO: 4.23 MILL/UL (ref 3.8–5.4)
SODIUM SERPL-SCNC: 141 MMOL/L (ref 136–145)
WBC: 4.3 THOU/UL (ref 4–11)

## 2021-07-19 ENCOUNTER — LAB REQUISITION (OUTPATIENT)
Dept: LAB | Facility: CLINIC | Age: 71
End: 2021-07-19
Payer: COMMERCIAL

## 2021-07-19 DIAGNOSIS — R30.0 DYSURIA: ICD-10-CM

## 2021-07-19 LAB
ALBUMIN UR-MCNC: NEGATIVE MG/DL
ANION GAP SERPL CALCULATED.3IONS-SCNC: 12 MMOL/L (ref 5–18)
APPEARANCE UR: CLEAR
BASOPHILS # BLD AUTO: 0 10E3/UL (ref 0–0.2)
BASOPHILS NFR BLD AUTO: 1 %
BILIRUB UR QL STRIP: NEGATIVE
BUN SERPL-MCNC: 42 MG/DL (ref 8–28)
CALCIUM SERPL-MCNC: 9.2 MG/DL (ref 8.5–10.5)
CHLORIDE BLD-SCNC: 104 MMOL/L (ref 98–107)
CO2 SERPL-SCNC: 23 MMOL/L (ref 22–31)
COLOR UR AUTO: COLORLESS
CREAT SERPL-MCNC: 0.84 MG/DL (ref 0.6–1.1)
EOSINOPHIL # BLD AUTO: 0.1 10E3/UL (ref 0–0.7)
EOSINOPHIL NFR BLD AUTO: 2 %
ERYTHROCYTE [DISTWIDTH] IN BLOOD BY AUTOMATED COUNT: 13.1 % (ref 10–15)
GFR SERPL CREATININE-BSD FRML MDRD: 69 ML/MIN/1.73M2
GLUCOSE BLD-MCNC: 91 MG/DL (ref 70–125)
GLUCOSE UR STRIP-MCNC: NEGATIVE MG/DL
HCT VFR BLD AUTO: 40.3 % (ref 35–47)
HGB BLD-MCNC: 12.9 G/DL (ref 11.7–15.7)
HGB UR QL STRIP: NEGATIVE
IMM GRANULOCYTES # BLD: 0 10E3/UL
IMM GRANULOCYTES NFR BLD: 0 %
KETONES UR STRIP-MCNC: NEGATIVE MG/DL
LEUKOCYTE ESTERASE UR QL STRIP: NEGATIVE
LYMPHOCYTES # BLD AUTO: 1.3 10E3/UL (ref 0.8–5.3)
LYMPHOCYTES NFR BLD AUTO: 19 %
MCH RBC QN AUTO: 30.6 PG (ref 26.5–33)
MCHC RBC AUTO-ENTMCNC: 32 G/DL (ref 31.5–36.5)
MCV RBC AUTO: 96 FL (ref 78–100)
MONOCYTES # BLD AUTO: 0.4 10E3/UL (ref 0–1.3)
MONOCYTES NFR BLD AUTO: 7 %
NEUTROPHILS # BLD AUTO: 4.7 10E3/UL (ref 1.6–8.3)
NEUTROPHILS NFR BLD AUTO: 71 %
NITRATE UR QL: NEGATIVE
NRBC # BLD AUTO: 0 10E3/UL
NRBC BLD AUTO-RTO: 0 /100
PH UR STRIP: 5 [PH] (ref 5–7)
PLATELET # BLD AUTO: 326 10E3/UL (ref 150–450)
POTASSIUM BLD-SCNC: 5.2 MMOL/L (ref 3.5–5)
RBC # BLD AUTO: 4.22 10E6/UL (ref 3.8–5.2)
SODIUM SERPL-SCNC: 139 MMOL/L (ref 136–145)
SP GR UR STRIP: 1.01 (ref 1–1.03)
UROBILINOGEN UR STRIP-MCNC: <2 MG/DL
WBC # BLD AUTO: 6.6 10E3/UL (ref 4–11)

## 2021-07-19 PROCEDURE — 81003 URINALYSIS AUTO W/O SCOPE: CPT | Mod: ORL | Performed by: NURSE PRACTITIONER

## 2021-07-19 PROCEDURE — 80048 BASIC METABOLIC PNL TOTAL CA: CPT | Mod: ORL | Performed by: NURSE PRACTITIONER

## 2021-07-19 PROCEDURE — 85025 COMPLETE CBC W/AUTO DIFF WBC: CPT | Mod: ORL | Performed by: NURSE PRACTITIONER

## 2021-07-20 ENCOUNTER — LAB REQUISITION (OUTPATIENT)
Dept: LAB | Facility: CLINIC | Age: 71
End: 2021-07-20
Payer: COMMERCIAL

## 2021-07-20 DIAGNOSIS — N18.30 CHRONIC KIDNEY DISEASE, STAGE 3 UNSPECIFIED (H): ICD-10-CM

## 2021-07-26 ENCOUNTER — LAB REQUISITION (OUTPATIENT)
Dept: LAB | Facility: CLINIC | Age: 71
End: 2021-07-26
Payer: COMMERCIAL

## 2021-07-26 DIAGNOSIS — I10 ESSENTIAL (PRIMARY) HYPERTENSION: ICD-10-CM

## 2021-07-27 ENCOUNTER — LAB REQUISITION (OUTPATIENT)
Dept: LAB | Facility: CLINIC | Age: 71
End: 2021-07-27
Payer: COMMERCIAL

## 2021-07-27 DIAGNOSIS — I10 ESSENTIAL (PRIMARY) HYPERTENSION: ICD-10-CM

## 2021-07-27 LAB — POTASSIUM BLD-SCNC: 4.1 MMOL/L (ref 3.5–5)

## 2021-07-27 PROCEDURE — 84132 ASSAY OF SERUM POTASSIUM: CPT | Mod: ORL | Performed by: NURSE PRACTITIONER

## 2021-07-27 PROCEDURE — P9603 ONE-WAY ALLOW PRORATED MILES: HCPCS | Mod: ORL | Performed by: NURSE PRACTITIONER

## 2021-07-27 PROCEDURE — 36415 COLL VENOUS BLD VENIPUNCTURE: CPT | Mod: ORL | Performed by: NURSE PRACTITIONER

## 2021-07-28 PROCEDURE — 36415 COLL VENOUS BLD VENIPUNCTURE: CPT | Mod: ORL | Performed by: NURSE PRACTITIONER

## 2021-07-28 PROCEDURE — P9603 ONE-WAY ALLOW PRORATED MILES: HCPCS | Mod: ORL | Performed by: NURSE PRACTITIONER

## 2021-07-28 PROCEDURE — 84132 ASSAY OF SERUM POTASSIUM: CPT | Mod: ORL | Performed by: NURSE PRACTITIONER

## 2021-07-29 LAB — POTASSIUM BLD-SCNC: 4 MMOL/L (ref 3.5–5)

## 2021-12-15 ENCOUNTER — LAB REQUISITION (OUTPATIENT)
Dept: LAB | Facility: CLINIC | Age: 71
End: 2021-12-15
Payer: COMMERCIAL

## 2021-12-15 DIAGNOSIS — E78.00 PURE HYPERCHOLESTEROLEMIA, UNSPECIFIED: ICD-10-CM

## 2021-12-15 DIAGNOSIS — K64.8 OTHER HEMORRHOIDS: ICD-10-CM

## 2021-12-15 DIAGNOSIS — M81.0 AGE-RELATED OSTEOPOROSIS WITHOUT CURRENT PATHOLOGICAL FRACTURE: ICD-10-CM

## 2021-12-15 DIAGNOSIS — Z29.89 ENCOUNTER FOR OTHER SPECIFIED PROPHYLACTIC MEASURES: ICD-10-CM

## 2021-12-15 DIAGNOSIS — G35 MULTIPLE SCLEROSIS (H): ICD-10-CM

## 2021-12-16 LAB
ANION GAP SERPL CALCULATED.3IONS-SCNC: 13 MMOL/L (ref 5–18)
BUN SERPL-MCNC: 45 MG/DL (ref 8–28)
CALCIUM SERPL-MCNC: 9.3 MG/DL (ref 8.5–10.5)
CHLORIDE BLD-SCNC: 107 MMOL/L (ref 98–107)
CO2 SERPL-SCNC: 23 MMOL/L (ref 22–31)
CREAT SERPL-MCNC: 0.91 MG/DL (ref 0.6–1.1)
GFR SERPL CREATININE-BSD FRML MDRD: 63 ML/MIN/1.73M2
GLUCOSE BLD-MCNC: 85 MG/DL (ref 70–125)
POTASSIUM BLD-SCNC: 5.1 MMOL/L (ref 3.5–5)
SODIUM SERPL-SCNC: 143 MMOL/L (ref 136–145)

## 2021-12-16 PROCEDURE — 36415 COLL VENOUS BLD VENIPUNCTURE: CPT | Mod: ORL | Performed by: FAMILY MEDICINE

## 2021-12-16 PROCEDURE — 80048 BASIC METABOLIC PNL TOTAL CA: CPT | Mod: ORL | Performed by: FAMILY MEDICINE

## 2021-12-16 PROCEDURE — P9603 ONE-WAY ALLOW PRORATED MILES: HCPCS | Mod: ORL | Performed by: FAMILY MEDICINE

## 2022-08-02 ENCOUNTER — LAB REQUISITION (OUTPATIENT)
Dept: LAB | Facility: CLINIC | Age: 72
End: 2022-08-02
Payer: COMMERCIAL

## 2022-08-02 DIAGNOSIS — G35 MULTIPLE SCLEROSIS (H): ICD-10-CM

## 2022-08-04 LAB
ANION GAP SERPL CALCULATED.3IONS-SCNC: 15 MMOL/L (ref 7–15)
BUN SERPL-MCNC: 42.7 MG/DL (ref 8–23)
CALCIUM SERPL-MCNC: 9.2 MG/DL (ref 8.8–10.2)
CHLORIDE SERPL-SCNC: 99 MMOL/L (ref 98–107)
CREAT SERPL-MCNC: 0.88 MG/DL (ref 0.51–0.95)
DEPRECATED HCO3 PLAS-SCNC: 25 MMOL/L (ref 22–29)
GFR SERPL CREATININE-BSD FRML MDRD: 69 ML/MIN/1.73M2
GLUCOSE SERPL-MCNC: 106 MG/DL (ref 70–99)
POTASSIUM SERPL-SCNC: 3.2 MMOL/L (ref 3.4–5.3)
SODIUM SERPL-SCNC: 139 MMOL/L (ref 136–145)

## 2022-08-04 PROCEDURE — P9604 ONE-WAY ALLOW PRORATED TRIP: HCPCS | Mod: ORL | Performed by: FAMILY MEDICINE

## 2022-08-04 PROCEDURE — 80048 BASIC METABOLIC PNL TOTAL CA: CPT | Mod: ORL | Performed by: FAMILY MEDICINE

## 2022-08-04 PROCEDURE — 36415 COLL VENOUS BLD VENIPUNCTURE: CPT | Mod: ORL | Performed by: FAMILY MEDICINE

## 2022-08-10 ENCOUNTER — LAB REQUISITION (OUTPATIENT)
Dept: LAB | Facility: CLINIC | Age: 72
End: 2022-08-10
Payer: COMMERCIAL

## 2022-08-10 DIAGNOSIS — E87.6 HYPOKALEMIA: ICD-10-CM

## 2022-08-11 LAB — POTASSIUM SERPL-SCNC: 4.1 MMOL/L (ref 3.4–5.3)

## 2022-08-11 PROCEDURE — P9603 ONE-WAY ALLOW PRORATED MILES: HCPCS | Mod: ORL | Performed by: NURSE PRACTITIONER

## 2022-08-11 PROCEDURE — 36415 COLL VENOUS BLD VENIPUNCTURE: CPT | Mod: ORL | Performed by: NURSE PRACTITIONER

## 2022-08-11 PROCEDURE — 84132 ASSAY OF SERUM POTASSIUM: CPT | Mod: ORL | Performed by: NURSE PRACTITIONER

## 2022-09-17 ENCOUNTER — LAB REQUISITION (OUTPATIENT)
Dept: LAB | Facility: CLINIC | Age: 72
End: 2022-09-17
Payer: COMMERCIAL

## 2022-09-17 DIAGNOSIS — R60.0 LOCALIZED EDEMA: ICD-10-CM

## 2022-09-20 LAB
ANION GAP SERPL CALCULATED.3IONS-SCNC: 14 MMOL/L (ref 7–15)
BUN SERPL-MCNC: 38 MG/DL (ref 8–23)
CALCIUM SERPL-MCNC: 9.2 MG/DL (ref 8.8–10.2)
CHLORIDE SERPL-SCNC: 100 MMOL/L (ref 98–107)
CREAT SERPL-MCNC: 0.83 MG/DL (ref 0.51–0.95)
DEPRECATED HCO3 PLAS-SCNC: 25 MMOL/L (ref 22–29)
GFR SERPL CREATININE-BSD FRML MDRD: 74 ML/MIN/1.73M2
GLUCOSE SERPL-MCNC: 112 MG/DL (ref 70–99)
POTASSIUM SERPL-SCNC: 3.8 MMOL/L (ref 3.4–5.3)
SODIUM SERPL-SCNC: 139 MMOL/L (ref 136–145)

## 2022-09-20 PROCEDURE — P9604 ONE-WAY ALLOW PRORATED TRIP: HCPCS | Mod: ORL | Performed by: FAMILY MEDICINE

## 2022-09-20 PROCEDURE — 36415 COLL VENOUS BLD VENIPUNCTURE: CPT | Mod: ORL | Performed by: FAMILY MEDICINE

## 2022-09-20 PROCEDURE — 80048 BASIC METABOLIC PNL TOTAL CA: CPT | Mod: ORL | Performed by: FAMILY MEDICINE

## 2022-11-01 ENCOUNTER — LAB REQUISITION (OUTPATIENT)
Dept: LAB | Facility: CLINIC | Age: 72
End: 2022-11-01
Payer: COMMERCIAL

## 2022-11-01 DIAGNOSIS — K64.8 OTHER HEMORRHOIDS: ICD-10-CM

## 2022-11-01 DIAGNOSIS — E78.00 PURE HYPERCHOLESTEROLEMIA, UNSPECIFIED: ICD-10-CM

## 2022-11-01 DIAGNOSIS — Z29.89 ENCOUNTER FOR OTHER SPECIFIED PROPHYLACTIC MEASURES: ICD-10-CM

## 2022-11-03 LAB
DEPRECATED CALCIDIOL+CALCIFEROL SERPL-MC: 54 UG/L (ref 20–75)
MAGNESIUM SERPL-MCNC: 1.9 MG/DL (ref 1.7–2.3)

## 2022-11-03 PROCEDURE — 36415 COLL VENOUS BLD VENIPUNCTURE: CPT | Mod: ORL | Performed by: FAMILY MEDICINE

## 2022-11-03 PROCEDURE — 82306 VITAMIN D 25 HYDROXY: CPT | Mod: ORL | Performed by: FAMILY MEDICINE

## 2022-11-03 PROCEDURE — 83735 ASSAY OF MAGNESIUM: CPT | Mod: ORL | Performed by: FAMILY MEDICINE

## 2022-11-03 PROCEDURE — P9604 ONE-WAY ALLOW PRORATED TRIP: HCPCS | Mod: ORL | Performed by: FAMILY MEDICINE

## 2023-01-25 ENCOUNTER — LAB REQUISITION (OUTPATIENT)
Dept: LAB | Facility: CLINIC | Age: 73
End: 2023-01-25
Payer: COMMERCIAL

## 2023-01-25 DIAGNOSIS — M81.0 AGE-RELATED OSTEOPOROSIS WITHOUT CURRENT PATHOLOGICAL FRACTURE: ICD-10-CM

## 2023-01-25 DIAGNOSIS — K64.8 OTHER HEMORRHOIDS: ICD-10-CM

## 2023-01-25 DIAGNOSIS — E78.00 PURE HYPERCHOLESTEROLEMIA, UNSPECIFIED: ICD-10-CM

## 2023-01-25 DIAGNOSIS — G35 MULTIPLE SCLEROSIS (H): ICD-10-CM

## 2023-01-25 DIAGNOSIS — Z29.89 ENCOUNTER FOR OTHER SPECIFIED PROPHYLACTIC MEASURES: ICD-10-CM

## 2023-01-26 LAB
ANION GAP SERPL CALCULATED.3IONS-SCNC: 10 MMOL/L (ref 7–15)
BUN SERPL-MCNC: 41.9 MG/DL (ref 8–23)
CALCIUM SERPL-MCNC: 9.1 MG/DL (ref 8.8–10.2)
CHLORIDE SERPL-SCNC: 105 MMOL/L (ref 98–107)
CREAT SERPL-MCNC: 0.78 MG/DL (ref 0.51–0.95)
DEPRECATED HCO3 PLAS-SCNC: 27 MMOL/L (ref 22–29)
GFR SERPL CREATININE-BSD FRML MDRD: 79 ML/MIN/1.73M2
GLUCOSE SERPL-MCNC: 83 MG/DL (ref 70–99)
POTASSIUM SERPL-SCNC: 4 MMOL/L (ref 3.4–5.3)
SODIUM SERPL-SCNC: 142 MMOL/L (ref 136–145)

## 2023-01-26 PROCEDURE — 36415 COLL VENOUS BLD VENIPUNCTURE: CPT | Mod: ORL | Performed by: NURSE PRACTITIONER

## 2023-01-26 PROCEDURE — P9603 ONE-WAY ALLOW PRORATED MILES: HCPCS | Mod: ORL | Performed by: NURSE PRACTITIONER

## 2023-01-26 PROCEDURE — 80048 BASIC METABOLIC PNL TOTAL CA: CPT | Mod: ORL | Performed by: NURSE PRACTITIONER

## 2023-03-30 ENCOUNTER — LAB REQUISITION (OUTPATIENT)
Dept: LAB | Facility: CLINIC | Age: 73
End: 2023-03-30
Payer: COMMERCIAL

## 2023-04-02 ENCOUNTER — LAB REQUISITION (OUTPATIENT)
Dept: LAB | Facility: CLINIC | Age: 73
End: 2023-04-02
Payer: COMMERCIAL

## 2023-04-02 LAB
ALBUMIN UR-MCNC: 10 MG/DL
APPEARANCE UR: ABNORMAL
BACTERIA #/AREA URNS HPF: ABNORMAL /HPF
BILIRUB UR QL STRIP: NEGATIVE
COLOR UR AUTO: ABNORMAL
GLUCOSE UR STRIP-MCNC: NEGATIVE MG/DL
HGB UR QL STRIP: NEGATIVE
KETONES UR STRIP-MCNC: NEGATIVE MG/DL
LEUKOCYTE ESTERASE UR QL STRIP: ABNORMAL
NITRATE UR QL: POSITIVE
PH UR STRIP: 5.5 [PH] (ref 5–7)
RBC URINE: 3 /HPF
SP GR UR STRIP: 1.01 (ref 1–1.03)
SQUAMOUS EPITHELIAL: 3 /HPF
TRANSITIONAL EPI: <1 /HPF
UROBILINOGEN UR STRIP-MCNC: NORMAL MG/DL
WBC CLUMPS #/AREA URNS HPF: PRESENT /HPF
WBC URINE: 89 /HPF

## 2023-04-02 PROCEDURE — 81001 URINALYSIS AUTO W/SCOPE: CPT | Mod: ORL | Performed by: FAMILY MEDICINE

## 2023-04-02 PROCEDURE — 87086 URINE CULTURE/COLONY COUNT: CPT | Performed by: FAMILY MEDICINE

## 2023-04-04 LAB
BACTERIA UR CULT: ABNORMAL
BACTERIA UR CULT: ABNORMAL

## 2024-01-17 ENCOUNTER — LAB REQUISITION (OUTPATIENT)
Dept: LAB | Facility: CLINIC | Age: 74
End: 2024-01-17
Payer: COMMERCIAL

## 2024-01-17 DIAGNOSIS — M81.0 AGE-RELATED OSTEOPOROSIS WITHOUT CURRENT PATHOLOGICAL FRACTURE: ICD-10-CM

## 2024-01-17 DIAGNOSIS — F32.9 MAJOR DEPRESSIVE DISORDER, SINGLE EPISODE, UNSPECIFIED: ICD-10-CM

## 2024-01-17 DIAGNOSIS — K64.8 OTHER HEMORRHOIDS: ICD-10-CM

## 2024-01-17 DIAGNOSIS — E78.00 PURE HYPERCHOLESTEROLEMIA, UNSPECIFIED: ICD-10-CM

## 2024-01-17 DIAGNOSIS — G35 MULTIPLE SCLEROSIS (H): ICD-10-CM

## 2024-01-18 LAB
ERYTHROCYTE [DISTWIDTH] IN BLOOD BY AUTOMATED COUNT: 13.1 % (ref 10–15)
HCT VFR BLD AUTO: 39.9 % (ref 35–47)
HGB BLD-MCNC: 12.7 G/DL (ref 11.7–15.7)
MCH RBC QN AUTO: 30.9 PG (ref 26.5–33)
MCHC RBC AUTO-ENTMCNC: 31.8 G/DL (ref 31.5–36.5)
MCV RBC AUTO: 97 FL (ref 78–100)
PLATELET # BLD AUTO: 259 10E3/UL (ref 150–450)
RBC # BLD AUTO: 4.11 10E6/UL (ref 3.8–5.2)
WBC # BLD AUTO: 6.3 10E3/UL (ref 4–11)

## 2024-01-18 PROCEDURE — 80053 COMPREHEN METABOLIC PANEL: CPT | Mod: ORL | Performed by: FAMILY MEDICINE

## 2024-01-18 PROCEDURE — 82465 ASSAY BLD/SERUM CHOLESTEROL: CPT | Mod: ORL | Performed by: FAMILY MEDICINE

## 2024-01-18 PROCEDURE — 82306 VITAMIN D 25 HYDROXY: CPT | Mod: ORL | Performed by: FAMILY MEDICINE

## 2024-01-18 PROCEDURE — 85027 COMPLETE CBC AUTOMATED: CPT | Mod: ORL | Performed by: FAMILY MEDICINE

## 2024-01-18 PROCEDURE — 36415 COLL VENOUS BLD VENIPUNCTURE: CPT | Mod: ORL | Performed by: FAMILY MEDICINE

## 2024-01-18 PROCEDURE — 82248 BILIRUBIN DIRECT: CPT | Mod: ORL | Performed by: FAMILY MEDICINE

## 2024-01-18 PROCEDURE — P9603 ONE-WAY ALLOW PRORATED MILES: HCPCS | Mod: ORL | Performed by: FAMILY MEDICINE

## 2024-01-19 LAB
ALBUMIN SERPL BCG-MCNC: 3.2 G/DL (ref 3.5–5.2)
ALP SERPL-CCNC: 54 U/L (ref 40–150)
ALT SERPL W P-5'-P-CCNC: 12 U/L (ref 0–50)
ANION GAP SERPL CALCULATED.3IONS-SCNC: 8 MMOL/L (ref 7–15)
AST SERPL W P-5'-P-CCNC: 20 U/L (ref 0–45)
BILIRUB DIRECT SERPL-MCNC: <0.2 MG/DL (ref 0–0.3)
BILIRUB SERPL-MCNC: 0.2 MG/DL
BUN SERPL-MCNC: 36.5 MG/DL (ref 8–23)
CALCIUM SERPL-MCNC: 8.7 MG/DL (ref 8.8–10.2)
CHLORIDE SERPL-SCNC: 106 MMOL/L (ref 98–107)
CHOLEST SERPL-MCNC: 140 MG/DL
CREAT SERPL-MCNC: 0.62 MG/DL (ref 0.51–0.95)
DEPRECATED HCO3 PLAS-SCNC: 28 MMOL/L (ref 22–29)
EGFRCR SERPLBLD CKD-EPI 2021: >90 ML/MIN/1.73M2
GLUCOSE SERPL-MCNC: 72 MG/DL (ref 70–99)
POTASSIUM SERPL-SCNC: 4.4 MMOL/L (ref 3.4–5.3)
PROT SERPL-MCNC: 5.6 G/DL (ref 6.4–8.3)
SODIUM SERPL-SCNC: 142 MMOL/L (ref 135–145)
VIT D+METAB SERPL-MCNC: 57 NG/ML (ref 20–50)

## 2024-03-06 ENCOUNTER — LAB REQUISITION (OUTPATIENT)
Dept: LAB | Facility: CLINIC | Age: 74
End: 2024-03-06
Payer: COMMERCIAL

## 2024-03-06 DIAGNOSIS — I10 ESSENTIAL (PRIMARY) HYPERTENSION: ICD-10-CM

## 2024-03-06 NOTE — LETTER
"    10/29/2020        RE: Yulia Loza  [address] [place] [id number]        [facility] DISCHARGE SUMMARY  Yulia Loza is being evaluated via a billable video visit.   The patient has been notified of following:  \"This video visit will be conducted via a call between you and a [facility] provider. We have found that certain health care needs can be provided without the need for an in-person physical exam.  This service lets us provide the care you need with a video conversation. If during the course of the call the provider feels a video visit is not appropriate, you will not be charged for this service.\"   The provider has received verbal consent for a Video Visit from the patient or family/first contact? Yes  Patient or /facility staff would like the video invitation sent by: N/A   Video Start Time: 10:42am    PATIENT'S NAME: Yulia Loza  YOB: 1950  MEDICAL RECORD NUMBER:  [id number]  Place of Location at the time of visit: [facility]  PRIMARY CARE PROVIDER AND CLINIC RESPONSIBLE AFTER TRANSFER:   Marisa Armstrong MD, [facility] [address] / [place] [address];  Non-[facility] Provider     Transferring providers: BERTIN Hernandez CNP; Cullen Carolina MD  Recent Hospitalization/ED:  \[facility]\"" stay 10/16/20 to 10/21/20.  Date of SNF Admission: October / 21 / 2020  Date of SNF (anticipated) Discharge: November / 03 / 2020  Discharged to: previous SNF    CODE STATUS/ADVANCE DIRECTIVES DISCUSSION:  Full Code   ALLERGIES: Atorvastatin, Bactrim [sulfamethoxazole w/trimethoprim], Codeine sulfate [codeine], Honey, Levaquin [levofloxacin], Niacin, Niacinamide, Oxycodone, Peanuts [nuts], and Tegaderm ag mesh [silver]    DISCHARGE DIAGNOSIS/NURSING FACILITY COURSE:   [facility] stay 10/16/20 through 10/21/20. Admitted to this facility for  rehab, medical management and nursing care. Patient with PMH MS, HTN, neurogenic bladder, and obesity presented to the ED " Initial SW/CM Assessment/Plan of Care Note     Baseline Assessment  64 year old admitted 3/5/2024 as Inpatient with a diagnosis of AML..   Prior to admission patient was living with Spouse and residing in a House.   Patient’s Primary Care Provider is Jovanni Benavidez MD.     Met with patient.  Introduced CM role.  Verified insurance and face sheet information.  Patient confirms she is independent with all ADL's.   Not active with HHC and will not anticipate any needs at discharge.         Plan  Patient/Family Discharge Goal: Home   Is patient/family goal achievable: Yes    SW/CM - Recommendations for Discharge: Home       Medical History  Past Medical History:   Diagnosis Date    Acne vulgaris 06/20/2013    Alopecia     AML (acute myeloblastic leukemia) (CMD)     Bacteremia due to Klebsiella pneumoniae 04/2023    Bilateral foot pain 11/08/2023    Flexural atopic dermatitis 01/21/2019    Neutropenic fever (CMD) 04/17/2023    Perirectal abscess 03/24/2023    s/p drainage    Rosacea        Prior to Admission Status  Functional Status  Ambulation: Independent/Self  Transportation: Family    Agency/Support  Type of Services Prior to Hospitalization: None               Support Systems: Family members, Spouse   Home Devices/Equipment: None         Mobility Assist Devices: None  Sensory Support Devices: Eyeglasses      Insurance  Primary: MEDICARE  Secondary: [employer] [place]    Disposition Recommendations:  SW/CM recommendation for discharge: Home            with acute respiratory failure. Tested positive for COVID-19. She was treated with supplemental oxygen, dexamethasone, and remdesivir. She was discharged off oxygen. She is at [facility] to complete her quarantine and then she will return to her SNF.    Pneumonia due to COVID-19 virus  No SOB, cough, hypoxia, fever. Based on her hospital course, she was considered to have had severe illness and recommended 20 days of isolation in order to prevent further spread. The 20 days will be completed next week and she will be safe to return to her facility. She is on a 30 day course of Lovenox for VTE prophylaxis    Multiple sclerosis (H)  Paraplegia (H)  Patient is dependent on staff for ADLs, [facility]ia for transfers, wheelchair bound. She uses a pummel vest twice daily for secretions. She has no acute concerns today     Essential hypertension  -180s/70-80s. Yulia says she has been on lisinopril in the past, but this was stopped due to hypotension. She did not have any side effects to it that she is aware of. Lisinopril 5mg was started 10/27/20. To avoid risk of hypotension, falls, dizziness and tissue hypoperfusion, recommend  BP goal is < 150/90mmHg.      Past Medical History:  has no past medical history on file.  Discharge Medications:    Current Outpatient Medications   Medication Sig Dispense Refill     acetaminophen (TYLENOL) 500 MG tablet Give 1000 mg @ 0800 and 500 mg @ 1200       alendronate (FOSAMAX) 70 MG tablet Take 70 mg by mouth once a week Every Saturday morning       ammonium lactate (AMLACTIN) 12 % external cream Apply topically every other day Apply to legs and feet       baclofen (LIORESAL) 20 MG tablet Take 20 mg by mouth 4 times daily       benzocaine (ANBESOL) 10 % gel Apply topically every 3 hours as needed       benzonatate (TESSALON) 100 MG capsule Take 100 mg by mouth every 4 hours as needed       diclofenac (VOLTAREN) 1 % topical gel Apply 4 g topically 4 times daily       docusate sodium  "(COLACE) 100 MG capsule Take 100 mg by mouth daily       enoxaparin ANTICOAGULANT (LOVENOX) 40 MG/0.4ML syringe Inject 40 mg Subcutaneous daily       lisinopril (ZESTRIL) 5 MG tablet Take 1 tablet (5 mg) by mouth daily       mirabegron (MYRBETRIQ) 50 MG 24 hr tablet Take 50 mg by mouth daily       nortriptyline (PAMELOR) 10 MG capsule Take 10 mg by mouth At Bedtime       nystatin (MYCOSTATIN) 897102 UNIT/GM external powder Apply 1 strip topically 2 times daily       oxyCODONE (ROXICODONE) 5 MG tablet Take 2.5 mg by mouth every 4 hours as needed       pravastatin (PRAVACHOL) 20 MG tablet Take 20 mg by mouth At Bedtime       sodium chloride (OCEAN) 0.65 % nasal spray Spray 1 spray in nostril 2 times daily       Vitamin D3 (CHOLECALCIFEROL) 125 MCG (5000 UT) tablet Take by mouth three times a week Give on Monday, Wednesday, and Friday        Medication Changes/Rationale:     Lisinopril was started for HTN  Controlled medications sent with patient:   ok to send remaining oxycodone     ROS: 10 point ROS of systems including Constitutional, Eyes, Respiratory, Cardiovascular, Gastroenterology, Genitourinary, Integumentary, Musculoskeletal, Psychiatric were all negative except for pertinent positives noted in my HPI.    Physical Exam: Vitals: /72   Pulse 95   Temp 98.5  F (36.9  C)   Resp 18   Ht 1.676 m (5' 6\")   Wt 85.3 kg (188 lb)   SpO2 92%   BMI 30.34 kg/m   BMI= Body mass index is 30.34 kg/m .  Limited Visit exam done for COVID-19 precautions  GENERAL APPEARANCE:  Alert, in no distress, morbidly obese  EYES:  Conjunctiva and lids normal  RESP:  no respiratory distress, no cough during visit  PSYCH: oriented x 3, affect and mood normal      SNF labs: none    DISCHARGE PLAN:    Follow up labs: No labs orders/due    Medical Follow Up:      Follow up with primary care provider in 1-2 weeks      TOTAL DISCHARGE TIME:   Less than or equal to 30 minutes  Electronically signed by:  Dominique Walker, BERTIN CNP "   [facility]  Phone: [phone]      Video-Visit Details  Type of service:  Video Visit  Video End Time (time video stopped): 10:43am  Distant Location (provider location):  [facility]

## 2024-03-13 ENCOUNTER — LAB REQUISITION (OUTPATIENT)
Dept: LAB | Facility: CLINIC | Age: 74
End: 2024-03-13
Payer: COMMERCIAL

## 2024-03-13 DIAGNOSIS — J18.9 PNEUMONIA, UNSPECIFIED ORGANISM: ICD-10-CM

## 2024-03-14 PROCEDURE — P9603 ONE-WAY ALLOW PRORATED MILES: HCPCS | Mod: ORL | Performed by: FAMILY MEDICINE

## 2024-03-15 ENCOUNTER — LAB REQUISITION (OUTPATIENT)
Dept: LAB | Facility: CLINIC | Age: 74
End: 2024-03-15
Payer: COMMERCIAL

## 2024-03-15 DIAGNOSIS — J18.9 PNEUMONIA, UNSPECIFIED ORGANISM: ICD-10-CM
